# Patient Record
(demographics unavailable — no encounter records)

---

## 2025-04-16 NOTE — IMMUNIZATIONS
[Immunizations are up to date] : Immunizations are up to date [Records maintained by [facility]] : Records maintained by [facility]OR[facility]

## 2025-04-16 NOTE — REVIEW OF SYSTEMS
[NI] : Endocrine [Nl] : Hematologic/Lymphatic [Menarche] : ~T menarche [Appropriate Age Development] : development appropriate for age

## 2025-04-17 NOTE — PHYSICAL EXAM
[PERRLA] : ADALGISA [Eyelids] : normal eyelids [Pupils] : pupils were equal and round [Gums] : normal gums [Mucosa] : moist and pink mucosa [Palate] : normal palate [S1, S2 Present] : S1, S2 present [Cardiac Auscultation] : normal cardiac auscultation  [Respiratory Effort] : normal respiratory effort [Clear to auscultation] : clear to auscultation [Soft] : soft [NonTender] : non tender [Non Distended] : non distended [Normal Bowel Sounds] : normal bowel sounds [No Hepatosplenomegaly] : no hepatosplenomegaly [No Abnormal Lymph Nodes Palpated] : no abnormal lymph nodes palpated [Muscle Strength] : normal muscle strength [Range Of Motion] : full range of motion [Gait] : normal gait [Intact Judgement] : intact judgement  [Insight Insight] : intact insight [Pronated flat feet] : pronated flat feet [Acute distress] : no acute distress [Rash] : no rash [Malar Erythema] : no malar erythema [Erythematous Conjunctiva] : nonerythematous conjunctiva [Erythematous Oropharynx] : nonerythematous oropharynx [Lesions] : no lesions [Murmurs] : no murmurs [Peripheral Edema] : no peripheral edema  [Joint effusions] : no joint effusions [de-identified] : mild tenderness to calf  [NumbJointsActiveArthritis] : 0 [NumbJointsLimitedMotion] : 0 [de-identified] : FROM C-spine; no pain or tenderness to palpation [de-identified] : no pain or tenderness to palpation [de-identified] : no pain or tenderness to palpation [de-identified] : negative FABERE bilaterally; no pain or tenderness to palpation [de-identified] : none [de-identified] : full forward flexion [de-identified] : no gross discrepancy

## 2025-04-17 NOTE — SOCIAL HISTORY
[Mother] : mother [Father] : father [Grade:  _____] : Grade: [unfilled] [___ Sisters] : [unfilled] sisters [FreeTextEntry1] : Plays volleyball and runs track 5 days a week

## 2025-04-17 NOTE — PHYSICAL EXAM
[PERRLA] : ADALGISA [Eyelids] : normal eyelids [Pupils] : pupils were equal and round [Gums] : normal gums [Mucosa] : moist and pink mucosa [Palate] : normal palate [S1, S2 Present] : S1, S2 present [Cardiac Auscultation] : normal cardiac auscultation  [Respiratory Effort] : normal respiratory effort [Clear to auscultation] : clear to auscultation [Soft] : soft [NonTender] : non tender [Non Distended] : non distended [Normal Bowel Sounds] : normal bowel sounds [No Hepatosplenomegaly] : no hepatosplenomegaly [No Abnormal Lymph Nodes Palpated] : no abnormal lymph nodes palpated [Muscle Strength] : normal muscle strength [Range Of Motion] : full range of motion [Gait] : normal gait [Intact Judgement] : intact judgement  [Insight Insight] : intact insight [Pronated flat feet] : pronated flat feet [Acute distress] : no acute distress [Rash] : no rash [Malar Erythema] : no malar erythema [Erythematous Conjunctiva] : nonerythematous conjunctiva [Erythematous Oropharynx] : nonerythematous oropharynx [Lesions] : no lesions [Murmurs] : no murmurs [Peripheral Edema] : no peripheral edema  [Joint effusions] : no joint effusions [de-identified] : mild tenderness to calf  [NumbJointsActiveArthritis] : 0 [NumbJointsLimitedMotion] : 0 [de-identified] : FROM C-spine; no pain or tenderness to palpation [de-identified] : no pain or tenderness to palpation [de-identified] : no pain or tenderness to palpation [de-identified] : none [de-identified] : negative FABERE bilaterally; no pain or tenderness to palpation [de-identified] : full forward flexion [de-identified] : no gross discrepancy

## 2025-04-17 NOTE — HISTORY OF PRESENT ILLNESS
[FreeTextEntry1] : Brittaney is a 15yo girl referred for calf pain.  Labs 4/14/2025: CBC: 7.86>12.8<245 ANC 5100 ALC 1960 ESR 8 * Uric Acid 3.5 TSH 1.25 FT4 0.9 STEPHANIE neg RF neg Lyme neg  First in 2020: couldn't step on foot and walk. Had Flu at the time. Took Abx and resolved. Recently in late Fall/early Winter, couldn't make fist and it hurt. Had a lot of stiffness. And now 2 weeks ago, started having pain along inner calf with running/stairs (started volleyball and track); has been ongoing for 3 weeks. No swelling except the fingers that first episode.   The leg pain is worse after running and being active. When rest, gets better. Has not taken Tylenol or Motrin. Massaging and hot shower helps sometimes. No AM stiffness, limping, or difficulty with ADLs. Not sitting out from volleyball or running due to pain.   Denies any recent illnesses, trauma/injury, fevers, rashes, oral lesions, headaches, vision changes, chest pain, difficulty breathing, palpitations, abdominal pain, n/v/d/c, hematuria, hematochezia, weakness, fatigue, or peripheral edema.

## 2025-04-17 NOTE — CONSULT LETTER
[Dear  ___] : Dear  [unfilled], [Courtesy Letter:] : I had the pleasure of seeing your patient, [unfilled], in my office today. [Please see my note below.] : Please see my note below. [Consult Closing:] : Thank you very much for allowing me to participate in the care of this patient.  If you have any questions, please do not hesitate to contact me. [Sincerely,] : Sincerely, [FreeTextEntry2] : Yunior Candelaria MD [address] [place] [id number] [FreeTextEntry3] : Frederick Leahy DO Attending Physician, Pediatric Rheumatology [facility] | [facility]

## 2025-04-17 NOTE — CONSULT LETTER
[Dear  ___] : Dear  [unfilled], [Courtesy Letter:] : I had the pleasure of seeing your patient, [unfilled], in my office today. [Please see my note below.] : Please see my note below. [Consult Closing:] : Thank you very much for allowing me to participate in the care of this patient.  If you have any questions, please do not hesitate to contact me. [Sincerely,] : Sincerely, [FreeTextEntry2] : Yunior Candelaria MD [address] [place] [id number] [FreeTextEntry3] : Frederick Leahy DO Attending Physician, Pediatric Rheumatology [facility] | [facility]